# Patient Record
Sex: MALE | URBAN - METROPOLITAN AREA
[De-identification: names, ages, dates, MRNs, and addresses within clinical notes are randomized per-mention and may not be internally consistent; named-entity substitution may affect disease eponyms.]

---

## 2023-01-23 ENCOUNTER — APPOINTMENT (OUTPATIENT)
Dept: URBAN - METROPOLITAN AREA CLINIC 287 | Age: 58
Setting detail: DERMATOLOGY
End: 2023-01-23

## 2023-01-23 DIAGNOSIS — D49.2 NEOPLASM OF UNSPECIFIED BEHAVIOR OF BONE, SOFT TISSUE, AND SKIN: ICD-10-CM

## 2023-01-23 DIAGNOSIS — B02.9 ZOSTER WITHOUT COMPLICATIONS: ICD-10-CM

## 2023-01-23 DIAGNOSIS — L23.9 ALLERGIC CONTACT DERMATITIS, UNSPECIFIED CAUSE: ICD-10-CM

## 2023-01-23 PROCEDURE — 11104 PUNCH BX SKIN SINGLE LESION: CPT

## 2023-01-23 PROCEDURE — 99203 OFFICE O/P NEW LOW 30 MIN: CPT | Mod: 25

## 2023-01-23 PROCEDURE — OTHER PRESCRIPTION: OTHER

## 2023-01-23 PROCEDURE — OTHER COUNSELING: OTHER

## 2023-01-23 PROCEDURE — OTHER BIOPSY BY PUNCH METHOD: OTHER

## 2023-01-23 PROCEDURE — OTHER PRESCRIPTION MEDICATION MANAGEMENT: OTHER

## 2023-01-23 PROCEDURE — OTHER MIPS QUALITY: OTHER

## 2023-01-23 RX ORDER — VALACYCLOVIR HYDROCHLORIDE 1 G/1
TABLET, FILM COATED ORAL TID
Qty: 21 | Refills: 1 | Status: ERX | COMMUNITY
Start: 2023-01-23

## 2023-01-23 ASSESSMENT — LOCATION ZONE DERM
LOCATION ZONE: TRUNK
LOCATION ZONE: FACE
LOCATION ZONE: PENIS

## 2023-01-23 ASSESSMENT — LOCATION SIMPLE DESCRIPTION DERM
LOCATION SIMPLE: RIGHT LOWER BACK
LOCATION SIMPLE: PENIS
LOCATION SIMPLE: LEFT EYEBROW
LOCATION SIMPLE: LEFT FOREHEAD

## 2023-01-23 ASSESSMENT — LOCATION DETAILED DESCRIPTION DERM
LOCATION DETAILED: LEFT SUPERIOR FOREHEAD
LOCATION DETAILED: DORSAL GLANS
LOCATION DETAILED: LEFT LATERAL EYEBROW
LOCATION DETAILED: RIGHT INFERIOR MEDIAL MIDBACK

## 2023-01-23 NOTE — PROCEDURE: BIOPSY BY PUNCH METHOD
Validate Triangulation: No
X Size Of Lesion In Cm (Optional): 0
Hemostasis: None
Was A Bandage Applied: Yes
Notification Instructions: Patient will be notified of biopsy results. However, patient instructed to call the office if not contacted within 2 weeks.
Suture Removal: 7 days
Epidermal Sutures: 5-0 Ethilon
Dressing: bandage
Biopsy Type: H and E
Anesthesia Type: 1% lidocaine with epinephrine
Home Suture Removal Text: Patient was provided a home suture removal kit and will remove their sutures at home.  If they have any questions or difficulties they will call the office.
Consent: Written consent was obtained and risks were reviewed including but not limited to scarring, infection, bleeding, scabbing, incomplete removal, nerve damage and allergy to anesthesia.
Information: Selecting Yes will display possible errors in your note based on the variables you have selected. This validation is only offered as a suggestion for you. PLEASE NOTE THAT THE VALIDATION TEXT WILL BE REMOVED WHEN YOU FINALIZE YOUR NOTE. IF YOU WANT TO FAX A PRELIMINARY NOTE YOU WILL NEED TO TOGGLE THIS TO 'NO' IF YOU DO NOT WANT IT IN YOUR FAXED NOTE.
Anesthesia Volume In Cc (Will Not Render If 0): 0.5
Detail Level: Detailed
Punch Size In Mm: 3
Post-Care Instructions: I reviewed with the patient in detail post-care instructions. Patient is to keep the biopsy site dry overnight, and then apply bacitracin twice daily until healed. Patient may apply hydrogen peroxide soaks to remove any crusting.
Wound Care: Bacitracin
Depth Of Punch Biopsy: dermis
Billing Type: Third-Party Bill

## 2023-02-06 ENCOUNTER — APPOINTMENT (OUTPATIENT)
Dept: URBAN - METROPOLITAN AREA SURGERY 22 | Age: 58
Setting detail: DERMATOLOGY
End: 2023-02-06

## 2023-02-06 DIAGNOSIS — L85.3 XEROSIS CUTIS: ICD-10-CM

## 2023-02-06 DIAGNOSIS — L30.0 NUMMULAR DERMATITIS: ICD-10-CM

## 2023-02-06 DIAGNOSIS — Z48.02 ENCOUNTER FOR REMOVAL OF SUTURES: ICD-10-CM

## 2023-02-06 PROCEDURE — OTHER PRESCRIPTION: OTHER

## 2023-02-06 PROCEDURE — OTHER MIPS QUALITY: OTHER

## 2023-02-06 PROCEDURE — OTHER SUTURE REMOVAL (GLOBAL PERIOD): OTHER

## 2023-02-06 PROCEDURE — OTHER COUNSELING: OTHER

## 2023-02-06 PROCEDURE — 99213 OFFICE O/P EST LOW 20 MIN: CPT

## 2023-02-06 PROCEDURE — OTHER PATHOLOGY DISCUSSION: OTHER

## 2023-02-06 RX ORDER — EMOLLIENT - LOTION
LOTION TOPICAL QD
Qty: 473 | Refills: 6 | Status: ERX | COMMUNITY
Start: 2023-02-06

## 2023-02-06 RX ORDER — TRIAMCINOLONE ACETONIDE 1 MG/G
CREAM TOPICAL BID
Qty: 80 | Refills: 1 | Status: ERX | COMMUNITY
Start: 2023-02-06

## 2023-02-06 ASSESSMENT — LOCATION DETAILED DESCRIPTION DERM
LOCATION DETAILED: SUPRAPUBIC SKIN
LOCATION DETAILED: RIGHT INFERIOR MEDIAL MIDBACK

## 2023-02-06 ASSESSMENT — LOCATION ZONE DERM: LOCATION ZONE: TRUNK

## 2023-02-06 ASSESSMENT — LOCATION SIMPLE DESCRIPTION DERM
LOCATION SIMPLE: GROIN
LOCATION SIMPLE: RIGHT LOWER BACK

## 2023-02-06 NOTE — PROCEDURE: SUTURE REMOVAL (GLOBAL PERIOD)
Add 28999 Cpt? (Important Note: In 2017 The Use Of 52266 Is Being Tracked By Cms To Determine Future Global Period Reimbursement For Global Periods): no
Detail Level: Detailed

## 2023-02-06 NOTE — PROCEDURE: MIPS QUALITY
Detail Level: Detailed
Quality 265: Biopsy Follow-Up: Biopsy results reviewed, communicated, tracked, and documented
Quality 226: Preventive Care And Screening: Tobacco Use: Screening And Cessation Intervention: Patient screened for tobacco use, is a smoker AND received Cessation Counseling within the Previous 12 Months
Quality 110: Preventive Care And Screening: Influenza Immunization: Influenza immunization was not ordered or administered, reason not given
Quality 431: Preventive Care And Screening: Unhealthy Alcohol Use - Screening: Patient not identified as an unhealthy alcohol user when screened for unhealthy alcohol use using a systematic screening method

## 2023-02-07 ENCOUNTER — RX ONLY (RX ONLY)
Age: 58
End: 2023-02-07

## 2023-02-07 RX ORDER — VALACYCLOVIR HYDROCHLORIDE 1 G/1
TABLET, FILM COATED ORAL TID
Qty: 21 | Refills: 1 | Status: ERX

## 2023-04-06 ENCOUNTER — APPOINTMENT (OUTPATIENT)
Dept: URBAN - METROPOLITAN AREA SURGERY 22 | Age: 58
Setting detail: DERMATOLOGY
End: 2023-04-06

## 2023-04-06 DIAGNOSIS — L29.8 OTHER PRURITUS: ICD-10-CM

## 2023-04-06 DIAGNOSIS — L85.3 XEROSIS CUTIS: ICD-10-CM

## 2023-04-06 DIAGNOSIS — L30.0 NUMMULAR DERMATITIS: ICD-10-CM

## 2023-04-06 PROCEDURE — OTHER COUNSELING: OTHER

## 2023-04-06 PROCEDURE — OTHER ADDITIONAL NOTES: OTHER

## 2023-04-06 PROCEDURE — 99214 OFFICE O/P EST MOD 30 MIN: CPT

## 2023-04-06 PROCEDURE — OTHER PRESCRIPTION MEDICATION MANAGEMENT: OTHER

## 2023-04-06 PROCEDURE — OTHER PRESCRIPTION: OTHER

## 2023-04-06 RX ORDER — CLOBETASOL PROPIONATE 0.5 MG/G
CREAM TOPICAL
Qty: 60 | Refills: 1 | Status: ERX | COMMUNITY
Start: 2023-04-06

## 2023-04-06 RX ORDER — GLY/DIMETH/PETROLAT,WHT/WATER
CREAM (GRAM) TOPICAL
Qty: 453 | Refills: 3 | Status: ERX | COMMUNITY
Start: 2023-04-06

## 2023-04-06 RX ORDER — HYDROXYZINE HYDROCHLORIDE 25 MG/1
TABLET, FILM COATED ORAL
Qty: 30 | Refills: 3 | Status: ERX | COMMUNITY
Start: 2023-04-06

## 2023-04-06 RX ORDER — CETIRIZINE HCL 1 MG/ML
SOLUTION, ORAL ORAL QD
Qty: 30 | Refills: 3 | Status: ERX | COMMUNITY
Start: 2023-04-06

## 2023-04-06 ASSESSMENT — LOCATION SIMPLE DESCRIPTION DERM
LOCATION SIMPLE: RIGHT LOWER BACK
LOCATION SIMPLE: GROIN

## 2023-04-06 ASSESSMENT — LOCATION DETAILED DESCRIPTION DERM
LOCATION DETAILED: RIGHT SUPERIOR MEDIAL LOWER BACK
LOCATION DETAILED: SUPRAPUBIC SKIN

## 2023-04-06 ASSESSMENT — LOCATION ZONE DERM: LOCATION ZONE: TRUNK

## 2023-04-06 NOTE — PROCEDURE: PRESCRIPTION MEDICATION MANAGEMENT
Continue Regimen: Cetaphil moisturizer, dove unscented
Initiate Treatment: Cooler showers, add humidifier to bedroom, clobetasol
Render In Strict Bullet Format?: No
Detail Level: Zone

## 2023-04-06 NOTE — HPI: DRY SKIN
Is This A New Presentation Or A Follow-Up?: Dry Skin
Additional History: Was prescribed cetaphil moisturizer at last visit

## 2023-04-06 NOTE — PROCEDURE: ADDITIONAL NOTES
Additional Notes: Patient states it is not working, it is getting worse
Detail Level: Simple
Render Risk Assessment In Note?: no

## 2023-04-06 NOTE — HPI: ECZEMA (PATIENT REPORTED)
Where Is Your Eczema Located?: Right mid back
Additional Comments (Use Complete Sentences): We sent triamcinolone at last visit, pathology proven

## 2023-12-19 ENCOUNTER — RX ONLY (RX ONLY)
Age: 58
End: 2023-12-19

## 2023-12-19 RX ORDER — CLOBETASOL PROPIONATE 0.5 MG/G
CREAM TOPICAL
Qty: 60 | Refills: 1 | Status: ERX

## 2023-12-19 RX ORDER — HYDROXYZINE HYDROCHLORIDE 25 MG/1
TABLET, FILM COATED ORAL
Qty: 30 | Refills: 3 | Status: ERX

## 2024-02-14 ENCOUNTER — APPOINTMENT (OUTPATIENT)
Dept: URBAN - METROPOLITAN AREA SURGERY 22 | Age: 59
Setting detail: DERMATOLOGY
End: 2024-02-14

## 2024-02-14 DIAGNOSIS — L29.8 OTHER PRURITUS: ICD-10-CM

## 2024-02-14 DIAGNOSIS — L20.89 OTHER ATOPIC DERMATITIS: ICD-10-CM

## 2024-02-14 PROCEDURE — OTHER COUNSELING: OTHER

## 2024-02-14 PROCEDURE — 99213 OFFICE O/P EST LOW 20 MIN: CPT

## 2024-02-14 PROCEDURE — OTHER ADDITIONAL NOTES: OTHER

## 2024-02-14 PROCEDURE — OTHER PRESCRIPTION MEDICATION MANAGEMENT: OTHER

## 2024-02-14 PROCEDURE — OTHER MIPS QUALITY: OTHER

## 2024-02-14 PROCEDURE — OTHER PRESCRIPTION: OTHER

## 2024-02-14 RX ORDER — HYDROXYZINE HYDROCHLORIDE 25 MG/1
TABLET, FILM COATED ORAL
Qty: 30 | Refills: 3 | Status: ERX

## 2024-02-14 RX ORDER — PIMECROLIMUS 10 MG/G
CREAM TOPICAL
Qty: 100 | Refills: 0 | Status: ERX | COMMUNITY
Start: 2024-02-14

## 2024-02-14 RX ORDER — CLOBETASOL PROPIONATE 0.5 MG/G
CREAM TOPICAL
Qty: 60 | Refills: 1 | Status: ERX

## 2024-02-14 ASSESSMENT — LOCATION SIMPLE DESCRIPTION DERM
LOCATION SIMPLE: RIGHT FOREARM
LOCATION SIMPLE: LEFT PRETIBIAL REGION
LOCATION SIMPLE: RIGHT PRETIBIAL REGION
LOCATION SIMPLE: RIGHT LOWER BACK
LOCATION SIMPLE: LOWER BACK
LOCATION SIMPLE: RIGHT POSTERIOR THIGH
LOCATION SIMPLE: UPPER BACK
LOCATION SIMPLE: RIGHT BUTTOCK
LOCATION SIMPLE: DORSAL PENIS
LOCATION SIMPLE: LEFT BUTTOCK
LOCATION SIMPLE: LEFT FOREARM

## 2024-02-14 ASSESSMENT — LOCATION DETAILED DESCRIPTION DERM
LOCATION DETAILED: LEFT PROXIMAL PRETIBIAL REGION
LOCATION DETAILED: SUPERIOR LUMBAR SPINE
LOCATION DETAILED: INFERIOR THORACIC SPINE
LOCATION DETAILED: RIGHT PROXIMAL DORSAL FOREARM
LOCATION DETAILED: RIGHT BUTTOCK
LOCATION DETAILED: RIGHT SUPERIOR MEDIAL LOWER BACK
LOCATION DETAILED: LEFT BUTTOCK
LOCATION DETAILED: PROXIMAL DORSAL GLANS PENIS
LOCATION DETAILED: RIGHT DISTAL POSTERIOR THIGH
LOCATION DETAILED: RIGHT PROXIMAL PRETIBIAL REGION
LOCATION DETAILED: LEFT PROXIMAL DORSAL FOREARM

## 2024-02-14 ASSESSMENT — LOCATION ZONE DERM
LOCATION ZONE: LEG
LOCATION ZONE: PENIS
LOCATION ZONE: ARM
LOCATION ZONE: TRUNK

## 2024-05-16 ENCOUNTER — APPOINTMENT (OUTPATIENT)
Dept: URBAN - METROPOLITAN AREA SURGERY 22 | Age: 59
Setting detail: DERMATOLOGY
End: 2024-05-16

## 2024-05-16 ENCOUNTER — RX ONLY (RX ONLY)
Age: 59
End: 2024-05-16

## 2024-05-16 DIAGNOSIS — L20.89 OTHER ATOPIC DERMATITIS: ICD-10-CM

## 2024-05-16 DIAGNOSIS — L29.8 OTHER PRURITUS: ICD-10-CM

## 2024-05-16 PROCEDURE — OTHER PRESCRIPTION: OTHER

## 2024-05-16 PROCEDURE — 99213 OFFICE O/P EST LOW 20 MIN: CPT

## 2024-05-16 PROCEDURE — OTHER COUNSELING: OTHER

## 2024-05-16 PROCEDURE — OTHER ADDITIONAL NOTES: OTHER

## 2024-05-16 RX ORDER — DUPILUMAB 300 MG/2ML
INJECTION, SOLUTION SUBCUTANEOUS
Qty: 2 | Refills: 11 | Status: ERX | COMMUNITY
Start: 2024-05-16

## 2024-05-16 RX ORDER — FEXOFENADINE HYDROCHLORIDE 180 MG/1
TABLET ORAL
Qty: 30 | Refills: 2 | Status: ERX | COMMUNITY
Start: 2024-05-16

## 2024-05-16 RX ORDER — HYDROXYZINE HYDROCHLORIDE 25 MG/1
TABLET, FILM COATED ORAL
Qty: 30 | Refills: 2 | Status: ERX

## 2024-05-16 RX ORDER — PIMECROLIMUS 10 MG/G
CREAM TOPICAL
Qty: 60 | Refills: 4 | Status: ERX

## 2024-05-16 RX ORDER — DUPILUMAB 300 MG/2ML
INJECTION, SOLUTION SUBCUTANEOUS
Qty: 2 | Refills: 0 | Status: ERX

## 2024-05-16 RX ORDER — CLOBETASOL PROPIONATE 0.5 MG/G
CREAM TOPICAL
Qty: 60 | Refills: 1 | Status: ERX

## 2024-05-16 ASSESSMENT — LOCATION SIMPLE DESCRIPTION DERM
LOCATION SIMPLE: GROIN
LOCATION SIMPLE: UPPER BACK
LOCATION SIMPLE: RIGHT POSTERIOR THIGH
LOCATION SIMPLE: LEFT UPPER BACK
LOCATION SIMPLE: LEFT THIGH
LOCATION SIMPLE: RIGHT UPPER BACK
LOCATION SIMPLE: LEFT POSTERIOR THIGH
LOCATION SIMPLE: ABDOMEN
LOCATION SIMPLE: RIGHT CALF
LOCATION SIMPLE: RIGHT THIGH
LOCATION SIMPLE: LEFT CALF
LOCATION SIMPLE: LOWER BACK

## 2024-05-16 ASSESSMENT — ITCH NUMERIC RATING SCALE: HOW SEVERE IS YOUR ITCHING?: 6

## 2024-05-16 ASSESSMENT — SEVERITY ASSESSMENT 2020: SEVERITY 2020: MODERATE

## 2024-05-16 ASSESSMENT — LOCATION DETAILED DESCRIPTION DERM
LOCATION DETAILED: LEFT DISTAL CALF
LOCATION DETAILED: RIGHT INFERIOR UPPER BACK
LOCATION DETAILED: SUPERIOR LUMBAR SPINE
LOCATION DETAILED: RIGHT PROXIMAL CALF
LOCATION DETAILED: LEFT PROXIMAL POSTERIOR THIGH
LOCATION DETAILED: PERIUMBILICAL SKIN
LOCATION DETAILED: SUPERIOR THORACIC SPINE
LOCATION DETAILED: RIGHT PROXIMAL POSTERIOR THIGH
LOCATION DETAILED: LEFT INFERIOR UPPER BACK
LOCATION DETAILED: RIGHT ANTERIOR PROXIMAL THIGH
LOCATION DETAILED: LEFT ANTERIOR PROXIMAL THIGH
LOCATION DETAILED: SUPRAPUBIC SKIN
LOCATION DETAILED: EPIGASTRIC SKIN

## 2024-05-16 ASSESSMENT — LOCATION ZONE DERM
LOCATION ZONE: LEG
LOCATION ZONE: TRUNK

## 2024-05-16 ASSESSMENT — BSA ECZEMA: % BODY COVERED IN ECZEMA: 25

## 2025-02-05 ENCOUNTER — RX ONLY (RX ONLY)
Age: 60
End: 2025-02-05

## 2025-02-05 RX ORDER — CLOBETASOL PROPIONATE 0.5 MG/G
CREAM TOPICAL
Qty: 60 | Refills: 1 | Status: ERX

## 2025-02-05 RX ORDER — PIMECROLIMUS 10 MG/G
CREAM TOPICAL
Qty: 60 | Refills: 4 | Status: ERX